# Patient Record
Sex: FEMALE | Race: WHITE | Employment: OTHER | URBAN - NONMETROPOLITAN AREA
[De-identification: names, ages, dates, MRNs, and addresses within clinical notes are randomized per-mention and may not be internally consistent; named-entity substitution may affect disease eponyms.]

---

## 2018-09-06 PROBLEM — I21.3 STEMI (ST ELEVATION MYOCARDIAL INFARCTION) (HCC): Status: ACTIVE | Noted: 2018-09-06

## 2023-01-04 ENCOUNTER — APPOINTMENT (OUTPATIENT)
Dept: CT IMAGING | Age: 73
End: 2023-01-04
Attending: EMERGENCY MEDICINE
Payer: MEDICARE

## 2023-01-04 ENCOUNTER — HOSPITAL ENCOUNTER (EMERGENCY)
Age: 73
Discharge: HOME | End: 2023-01-04
Attending: EMERGENCY MEDICINE
Payer: MEDICARE

## 2023-01-04 ENCOUNTER — APPOINTMENT (OUTPATIENT)
Dept: GENERAL RADIOLOGY | Age: 73
End: 2023-01-04
Attending: EMERGENCY MEDICINE
Payer: MEDICARE

## 2023-01-04 VITALS
HEIGHT: 62 IN | DIASTOLIC BLOOD PRESSURE: 73 MMHG | HEART RATE: 62 BPM | SYSTOLIC BLOOD PRESSURE: 129 MMHG | WEIGHT: 111.5 LBS | RESPIRATION RATE: 20 BRPM | OXYGEN SATURATION: 100 % | TEMPERATURE: 97.6 F | BODY MASS INDEX: 20.52 KG/M2

## 2023-01-04 DIAGNOSIS — R51.9 NONINTRACTABLE EPISODIC HEADACHE, UNSPECIFIED HEADACHE TYPE: Primary | ICD-10-CM
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
## 2023-01-04 DIAGNOSIS — M79.605 PAIN OF LEFT LOWER EXTREMITY: ICD-10-CM

## 2023-01-04 LAB
ANION GAP SERPL CALC-SCNC: 12 MMOL/L (ref 3–18)
ATRIAL RATE: 69 BPM
BASOPHILS # BLD: 0.1 K/UL (ref 0–0.1)
BASOPHILS NFR BLD: 1 % (ref 0–2)
BUN SERPL-MCNC: 17 MG/DL (ref 7–18)
BUN/CREAT SERPL: 18 (ref 12–20)
CA-I BLD-MCNC: 9.8 MG/DL (ref 8.5–10.1)
CALCULATED P AXIS, ECG09: 44 DEGREES
CALCULATED R AXIS, ECG10: 9 DEGREES
CALCULATED T AXIS, ECG11: 75 DEGREES
CHLORIDE SERPL-SCNC: 106 MMOL/L (ref 100–111)
CK SERPL-CCNC: 57 U/L (ref 26–192)
CO2 SERPL-SCNC: 24 MMOL/L (ref 21–32)
CREAT SERPL-MCNC: 0.93 MG/DL (ref 0.6–1.3)
DIAGNOSIS, 93000: NORMAL
DIFFERENTIAL METHOD BLD: ABNORMAL
EOSINOPHIL # BLD: 0.2 K/UL (ref 0–0.4)
EOSINOPHIL NFR BLD: 2 % (ref 0–5)
ERYTHROCYTE [DISTWIDTH] IN BLOOD BY AUTOMATED COUNT: 13.2 % (ref 11.6–14.5)
GLUCOSE SERPL-MCNC: 104 MG/DL (ref 74–99)
HCT VFR BLD AUTO: 35 % (ref 35–45)
HGB BLD-MCNC: 11.9 G/DL (ref 12–16)
IMM GRANULOCYTES # BLD AUTO: 0 K/UL (ref 0–0.04)
IMM GRANULOCYTES NFR BLD AUTO: 0 % (ref 0–0.5)
LYMPHOCYTES # BLD: 3.7 K/UL (ref 0.9–3.6)
LYMPHOCYTES NFR BLD: 50 % (ref 21–52)
MAGNESIUM SERPL-MCNC: 2.1 MG/DL (ref 1.6–2.6)
MCH RBC QN AUTO: 30.2 PG (ref 24–34)
MCHC RBC AUTO-ENTMCNC: 34 G/DL (ref 31–37)
MCV RBC AUTO: 88.8 FL (ref 78–100)
MONOCYTES # BLD: 0.3 K/UL (ref 0.05–1.2)
MONOCYTES NFR BLD: 5 % (ref 3–10)
NEUTS SEG # BLD: 3.1 K/UL (ref 1.8–8)
NEUTS SEG NFR BLD: 42 % (ref 40–73)
NRBC # BLD: 0 K/UL (ref 0–0.01)
NRBC BLD-RTO: 0 PER 100 WBC
P-R INTERVAL, ECG05: 171 MS
PLATELET # BLD AUTO: 325 K/UL (ref 135–420)
PMV BLD AUTO: 10.3 FL (ref 9.2–11.8)
POTASSIUM SERPL-SCNC: 4.1 MMOL/L (ref 3.5–5.5)
Q-T INTERVAL, ECG07: 407 MS
QRS DURATION, ECG06: 162 MS
QTC CALCULATION (BEZET), ECG08: 443 MS
RBC # BLD AUTO: 3.94 M/UL (ref 4.2–5.3)
SODIUM SERPL-SCNC: 142 MMOL/L (ref 136–145)
TROPONIN-HIGH SENSITIVITY: 5 NG/L (ref 0–54)
TROPONIN-HIGH SENSITIVITY: 6 NG/L (ref 0–54)
VENTRICULAR RATE, ECG03: 71 BPM
WBC # BLD AUTO: 7.4 K/UL (ref 4.6–13.2)

## 2023-01-04 PROCEDURE — 99285 EMERGENCY DEPT VISIT HI MDM: CPT

## 2023-01-04 PROCEDURE — 83735 ASSAY OF MAGNESIUM: CPT

## 2023-01-04 PROCEDURE — 71045 X-RAY EXAM CHEST 1 VIEW: CPT

## 2023-01-04 PROCEDURE — 74011250637 HC RX REV CODE- 250/637: Performed by: EMERGENCY MEDICINE

## 2023-01-04 PROCEDURE — 73590 X-RAY EXAM OF LOWER LEG: CPT

## 2023-01-04 PROCEDURE — 73552 X-RAY EXAM OF FEMUR 2/>: CPT

## 2023-01-04 PROCEDURE — 82550 ASSAY OF CK (CPK): CPT

## 2023-01-04 PROCEDURE — 96374 THER/PROPH/DIAG INJ IV PUSH: CPT

## 2023-01-04 PROCEDURE — 85025 COMPLETE CBC W/AUTO DIFF WBC: CPT

## 2023-01-04 PROCEDURE — 84484 ASSAY OF TROPONIN QUANT: CPT

## 2023-01-04 PROCEDURE — 80048 BASIC METABOLIC PNL TOTAL CA: CPT

## 2023-01-04 PROCEDURE — 36415 COLL VENOUS BLD VENIPUNCTURE: CPT

## 2023-01-04 PROCEDURE — 74011250636 HC RX REV CODE- 250/636: Performed by: EMERGENCY MEDICINE

## 2023-01-04 PROCEDURE — 70450 CT HEAD/BRAIN W/O DYE: CPT

## 2023-01-04 PROCEDURE — 72170 X-RAY EXAM OF PELVIS: CPT

## 2023-01-04 PROCEDURE — 93005 ELECTROCARDIOGRAM TRACING: CPT

## 2023-01-04 RX ORDER — SUMATRIPTAN 50 MG/1
50 TABLET, FILM COATED ORAL
Status: COMPLETED | OUTPATIENT
Start: 2023-01-04 | End: 2023-01-04

## 2023-01-04 RX ORDER — DULOXETIN HYDROCHLORIDE 30 MG/1
30 CAPSULE, DELAYED RELEASE ORAL DAILY
COMMUNITY

## 2023-01-04 RX ORDER — ACETAMINOPHEN 325 MG/1
650 TABLET ORAL
Status: COMPLETED | OUTPATIENT
Start: 2023-01-04 | End: 2023-01-04

## 2023-01-04 RX ORDER — ONDANSETRON 2 MG/ML
4 INJECTION INTRAMUSCULAR; INTRAVENOUS
Status: COMPLETED | OUTPATIENT
Start: 2023-01-04 | End: 2023-01-04

## 2023-01-04 RX ORDER — BISMUTH SUBSALICYLATE 262 MG
1 TABLET,CHEWABLE ORAL DAILY
COMMUNITY

## 2023-01-04 RX ORDER — DONEPEZIL HYDROCHLORIDE 10 MG/1
10 TABLET, FILM COATED ORAL DAILY
COMMUNITY

## 2023-01-04 RX ORDER — ONDANSETRON 4 MG/1
4 TABLET, FILM COATED ORAL
Qty: 20 TABLET | Refills: 0 | Status: SHIPPED | OUTPATIENT
Start: 2023-01-04

## 2023-01-04 RX ORDER — SODIUM CHLORIDE 9 MG/ML
500 INJECTION, SOLUTION INTRAVENOUS ONCE
Status: COMPLETED | OUTPATIENT
Start: 2023-01-04 | End: 2023-01-04

## 2023-01-04 RX ADMIN — SUMATRIPTAN SUCCINATE 50 MG: 50 TABLET ORAL at 06:35

## 2023-01-04 RX ADMIN — SODIUM CHLORIDE 500 ML: 9 INJECTION, SOLUTION INTRAVENOUS at 04:20

## 2023-01-04 RX ADMIN — ACETAMINOPHEN 650 MG: 325 TABLET ORAL at 06:35

## 2023-01-04 RX ADMIN — ONDANSETRON 4 MG: 2 INJECTION INTRAMUSCULAR; INTRAVENOUS at 04:19

## 2023-01-04 NOTE — ED TRIAGE NOTES
Patient states she woke up from sleep with severe pain to her right lower posterior leg. When trying to stand on leg she fell unable to weight on the leg. Patient grimacing in pain and running hand through her hair.  When patient asked further questions she states \"I can't think right now, my leg hurts so bad\"

## 2023-01-04 NOTE — ED NOTES
Patient states she is getting a migraine. Patient sister states she takes Imitrex for her migraines. Dr. Rory Duque made aware and orders given for Imitrex and Tylenol.

## 2023-01-04 NOTE — ED PROVIDER NOTES
Pt w h/o dementia, limiting history. History provided by pt sister, who states pt has been staying w her over holidays, is confused at baseline, no change, but c/o headache intermittent x few days. C/o worsening pain pta, then fell on left hip. C/o left leg pain. No meds for sx's pta. + nausea, no vomiting. No weakness. No cp or sob. No fever or cough. Past Medical History:   Diagnosis Date    Back pain     Dementia (HCC)     High cholesterol     Migraine        History reviewed. No pertinent surgical history. History reviewed. No pertinent family history. Social History     Socioeconomic History    Marital status: SINGLE     Spouse name: Not on file    Number of children: Not on file    Years of education: Not on file    Highest education level: Not on file   Occupational History    Not on file   Tobacco Use    Smoking status: Never    Smokeless tobacco: Never   Substance and Sexual Activity    Alcohol use: Yes     Comment: socially    Drug use: No    Sexual activity: Not on file   Other Topics Concern    Not on file   Social History Narrative    Not on file     Social Determinants of Health     Financial Resource Strain: Not on file   Food Insecurity: Not on file   Transportation Needs: Not on file   Physical Activity: Not on file   Stress: Not on file   Social Connections: Not on file   Intimate Partner Violence: Not on file   Housing Stability: Not on file         ALLERGIES: Demerol [meperidine] and Sulfa (sulfonamide antibiotics)    Review of Systems   Unable to perform ROS: Dementia     Vitals:    01/04/23 0345 01/04/23 0521 01/04/23 0631   BP: 103/74 103/62 (!) 124/58   Pulse: 82 67 62   Resp: 18 17 18   Temp: 97.6 °F (36.4 °C)     SpO2: 100% 100% 100%   Weight: 50.6 kg (111 lb 8 oz)     Height: 5' 2\" (1.575 m)              Physical Exam  Vitals and nursing note reviewed. Constitutional:       Appearance: She is well-developed. She is not diaphoretic.    HENT:      Head: Normocephalic and atraumatic. Eyes:      Pupils: Pupils are equal, round, and reactive to light. Cardiovascular:      Rate and Rhythm: Normal rate and regular rhythm. Heart sounds: No murmur heard. Pulmonary:      Effort: Pulmonary effort is normal.      Breath sounds: No wheezing. Abdominal:      Palpations: Abdomen is soft. Tenderness: There is no abdominal tenderness. Musculoskeletal:         General: Tenderness (+ diffuse left leg ttp, from intact. nvi) present. Cervical back: Normal range of motion. No tenderness. Skin:     General: Skin is dry. Capillary Refill: Capillary refill takes less than 2 seconds. Findings: No rash. Neurological:      Mental Status: She is alert. Cranial Nerves: No cranial nerve deficit. Motor: No weakness. Comments: A and o x 1  [place] x4, follows commands          Medical Decision Making  Amount and/or Complexity of Data Reviewed  Labs: ordered. Radiology: ordered. ECG/medicine tests: ordered. Risk  OTC drugs. Prescription drug management.            Procedures    Vitals:  Patient Vitals for the past 12 hrs:   Temp Pulse Resp BP SpO2   01/04/23 0631 -- 62 18 (!) 124/58 100 %   01/04/23 0521 -- 67 17 103/62 100 %   01/04/23 0345 97.6 °F (36.4 °C) 82 18 103/74 100 %         Medications ordered:   Medications   0.9% sodium chloride infusion 500 mL (0 mL IntraVENous IV Completed 1/4/23 1020)   ondansetron (ZOFRAN) injection 4 mg (4 mg IntraVENous Given 1/4/23 4784)   SUMAtriptan (IMITREX) tablet 50 mg (50 mg Oral Given 1/4/23 8949)   acetaminophen (TYLENOL) tablet 650 mg (650 mg Oral Given 1/4/23 0635)         Lab findings:  Recent Results (from the past 12 hour(s))   CK    Collection Time: 01/04/23  4:15 AM   Result Value Ref Range    CK 57 26 - 192 U/L   CBC WITH AUTOMATED DIFF    Collection Time: 01/04/23  4:15 AM   Result Value Ref Range    WBC 7.4 4.6 - 13.2 K/uL    RBC 3.94 (L) 4.20 - 5.30 M/uL    HGB 11.9 (L) 12.0 - 16.0 g/dL    HCT 35.0 35.0 - 45.0 %    MCV 88.8 78.0 - 100.0 FL    MCH 30.2 24.0 - 34.0 PG    MCHC 34.0 31.0 - 37.0 g/dL    RDW 13.2 11.6 - 14.5 %    PLATELET 168 331 - 619 K/uL    MPV 10.3 9.2 - 11.8 FL    NRBC 0.0 0.0  WBC    ABSOLUTE NRBC 0.00 0.00 - 0.01 K/uL    NEUTROPHILS 42 40 - 73 %    LYMPHOCYTES 50 21 - 52 %    MONOCYTES 5 3 - 10 %    EOSINOPHILS 2 0 - 5 %    BASOPHILS 1 0 - 2 %    IMMATURE GRANULOCYTES 0 0 - 0.5 %    ABS. NEUTROPHILS 3.1 1.8 - 8.0 K/UL    ABS. LYMPHOCYTES 3.7 (H) 0.9 - 3.6 K/UL    ABS. MONOCYTES 0.3 0.05 - 1.2 K/UL    ABS. EOSINOPHILS 0.2 0.0 - 0.4 K/UL    ABS. BASOPHILS 0.1 0.0 - 0.1 K/UL    ABS. IMM.  GRANS. 0.0 0.00 - 0.04 K/UL    DF AUTOMATED     METABOLIC PANEL, BASIC    Collection Time: 01/04/23  4:15 AM   Result Value Ref Range    Sodium 142 136 - 145 mmol/L    Potassium 4.1 3.5 - 5.5 mmol/L    Chloride 106 100 - 111 mmol/L    CO2 24 21 - 32 mmol/L    Anion gap 12 3.0 - 18.0 mmol/L    Glucose 104 (H) 74 - 99 mg/dL    BUN 17 7 - 18 mg/dL    Creatinine 0.93 0.60 - 1.30 mg/dL    BUN/Creatinine ratio 18 12 - 20      eGFR >60 >60 ml/min/1.73m2    Calcium 9.8 8.5 - 10.1 mg/dL   TROPONIN-HIGH SENSITIVITY    Collection Time: 01/04/23  4:15 AM   Result Value Ref Range    Troponin-High Sensitivity 5 0 - 54 ng/L   MAGNESIUM    Collection Time: 01/04/23  4:15 AM   Result Value Ref Range    Magnesium 2.1 1.6 - 2.6 mg/dL   EKG, 12 LEAD, INITIAL    Collection Time: 01/04/23  4:21 AM   Result Value Ref Range    Ventricular Rate 71 BPM    Atrial Rate 69 BPM    P-R Interval 171 ms    QRS Duration 162 ms    Q-T Interval 407 ms    QTC Calculation (Bezet) 443 ms    Calculated P Axis 44 degrees    Calculated R Axis 9 degrees    Calculated T Axis 75 degrees    Diagnosis       Sinus rhythm  Nonspecific intraventricular conduction delay  Inferolateral infarct, acute  Anterior infarct, acute (LAD)  >>> Acute MI <<<     TROPONIN-HIGH SENSITIVITY    Collection Time: 01/04/23  6:15 AM   Result Value Ref Range    Troponin-High Sensitivity 6 0 - 54 ng/L           X-Ray, CT or other radiology findings or impressions:  XR FEMUR LT 2 V   Final Result      1. No acute osseous fracture or dislocation      XR PELV 1 OR 2 V   Final Result      1. No acute osseous fracture or dislocation      XR TIB/FIB LT   Final Result      1. No acute osseous abnormality. XR CHEST SNGL V   Final Result      1. Mild right upper lobe atelectasis. Otherwise, No acute cardiopulmonary   process. CT HEAD WO CONT   Final Result      1. No CT findings of an acute intracranial abnormality. Please note that   noncontrast head CT may be normal in early acute infarct. 2. Mild to moderate burden of chronic white matter changes, statistically   favoring chronic small vessel ischemic changes. Progress notes, Consult notes or additional Procedure notes:   6:52 AM pt w no further sx's, had req her am dose of imitrex, given. No curr pain. Mild confusion, at baseline per familiy. No emc. Not c/w ich/meningitis/sepsis/dissection/acs/pe. Detailed ret inst given. Stressed close f/up. Det ret inst given. Diagnosis:   1. Nonintractable episodic headache, unspecified headache type    2. Fall, initial encounter    3.  Pain of left lower extremity        Disposition: home    Follow-up Information       Follow up With Specialties Details Why Contact Info    [facility] EMERGENCY DEPT Emergency Medicine Go to  As needed, If symptoms worsen [address]  [phone]    Skyler Galindo MD Neurology Schedule an appointment as soon as possible for a visit in 3 days  [address] [address] [address]. Kehinde Hager NP Nurse Practitioner Schedule an appointment as soon as possible for a visit in 2 days  [address]  [phone]               Patient's Medications   Start Taking    ONDANSETRON HCL (ZOFRAN) 4 MG TABLET    Take 1 Tablet by mouth every eight (8) hours as needed for Nausea or Vomiting. Continue Taking    ASPIRIN 81 MG CHEWABLE TABLET    Take 1 Tab by mouth daily. CALCIUM CARBONATE (CALCIUM 600 PO)    Take 600 mg by mouth two (2) times a day. DONEPEZIL (ARICEPT) 10 MG TABLET    Take 10 mg by mouth daily. DULOXETINE (CYMBALTA) 30 MG CAPSULE    Take 30 mg by mouth daily. GABAPENTIN (NEURONTIN) 300 MG CAPSULE    Take 300 mg by mouth two (2) times a day. LISINOPRIL (PRINIVIL, ZESTRIL) 2.5 MG TABLET    Take 1 Tab by mouth daily. MULTIVITAMIN (ONE A DAY) TABLET    Take 1 Tablet by mouth daily. PREDNISONE (STERAPRED DS) 10 MG DOSE PACK    Take  by mouth See Admin Instructions. See administration instruction per 10mg dose pack    ROSUVASTATIN (CRESTOR) 10 MG TABLET    Take 1 Tab by mouth nightly. SUMATRIPTAN (IMITREX) 100 MG TABLET    Take 100 mg by mouth once as needed for Migraine.    These Medications have changed    No medications on file   Stop Taking    No medications on file